# Patient Record
Sex: FEMALE | Race: WHITE
[De-identification: names, ages, dates, MRNs, and addresses within clinical notes are randomized per-mention and may not be internally consistent; named-entity substitution may affect disease eponyms.]

---

## 2020-12-12 ENCOUNTER — HOSPITAL ENCOUNTER (EMERGENCY)
Dept: HOSPITAL 63 - ER | Age: 35
Discharge: HOME | End: 2020-12-12
Payer: COMMERCIAL

## 2020-12-12 VITALS
DIASTOLIC BLOOD PRESSURE: 68 MMHG | SYSTOLIC BLOOD PRESSURE: 106 MMHG | DIASTOLIC BLOOD PRESSURE: 68 MMHG | SYSTOLIC BLOOD PRESSURE: 106 MMHG

## 2020-12-12 VITALS — HEIGHT: 68 IN | WEIGHT: 143.3 LBS | BODY MASS INDEX: 21.72 KG/M2

## 2020-12-12 DIAGNOSIS — J32.0: ICD-10-CM

## 2020-12-12 DIAGNOSIS — R51.9: Primary | ICD-10-CM

## 2020-12-12 PROCEDURE — 96374 THER/PROPH/DIAG INJ IV PUSH: CPT

## 2020-12-12 PROCEDURE — 96361 HYDRATE IV INFUSION ADD-ON: CPT

## 2020-12-12 PROCEDURE — 96375 TX/PRO/DX INJ NEW DRUG ADDON: CPT

## 2020-12-12 PROCEDURE — 99284 EMERGENCY DEPT VISIT MOD MDM: CPT

## 2020-12-12 PROCEDURE — 70450 CT HEAD/BRAIN W/O DYE: CPT

## 2020-12-12 NOTE — RAD
EXAMINATION: CT HEAD/BRAIN WO (CT HEAD WITHOUT IV CONTRAST)



CLINICAL HISTORY: Headache



TECHNIQUE: Serial axial images without IV contrast were obtained from the vertex to the foramen magnu
m.



CT Dose Reduction Employed: One or more of the following individualized dose reduction techniques wer
e utilized for this examination:  1. Automated exposure control  2. Adjustment of the mA and/or kV ac
cording to patient size  3. Use of iterative reconstruction technique.



COMPARISON: None





FINDINGS:   



Post-operative change: None.



Acute change: No evidence of an acute infarct or other acute parenchymal process. 

  

Hemorrhage: No evidence of acute intracranial hemorrhage. 



Mass Lesion / Mass Effect: There is no evidence of an intracranial mass or extraaxial fluid collectio
n. No significant mass effect. 

 

Chronic change: None apparent. 



Parenchyma: There is no significant volume loss. The brain parenchyma is otherwise within normal limi
ts for age. 



Ventricles: The ventricles are within normal limits of size and configuration for age. 



Paranasal sinuses and skull base: Secretions in the partially visualized left greater than right maxi
llary sinuses. The skull base and imaged soft tissues are unremarkable.





IMPRESSION:  



No evidence of acute intracranial abnormality.



Left greater than right maxillary sinus disease.



Electronically signed by: Ezra Hughes DO (12/12/2020 8:06 AM) [facility]
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
## 2021-01-16 ENCOUNTER — HOSPITAL ENCOUNTER (OUTPATIENT)
Dept: HOSPITAL 63 - LAB | Age: 36
End: 2021-01-16
Payer: COMMERCIAL

## 2021-01-16 DIAGNOSIS — Z20.828: ICD-10-CM

## 2021-01-16 DIAGNOSIS — R09.89: ICD-10-CM

## 2021-01-16 DIAGNOSIS — Z01.812: Primary | ICD-10-CM

## 2021-01-16 DIAGNOSIS — J02.9: ICD-10-CM

## 2021-01-16 DIAGNOSIS — R51.9: ICD-10-CM

## 2021-01-16 PROCEDURE — U0003 INFECTIOUS AGENT DETECTION BY NUCLEIC ACID (DNA OR RNA); SEVERE ACUTE RESPIRATORY SYNDROME CORONAVIRUS 2 (SARS-COV-2) (CORONAVIRUS DISEASE [COVID-19]), AMPLIFIED PROBE TECHNIQUE, MAKING USE OF HIGH THROUGHPUT TECHNOLOGIES AS DESCRIBED BY CMS-2020-01-R: HCPCS
